# Patient Record
Sex: FEMALE | Race: BLACK OR AFRICAN AMERICAN | Employment: FULL TIME | URBAN - NONMETROPOLITAN AREA
[De-identification: names, ages, dates, MRNs, and addresses within clinical notes are randomized per-mention and may not be internally consistent; named-entity substitution may affect disease eponyms.]

---

## 2022-07-07 ENCOUNTER — HOSPITAL ENCOUNTER (EMERGENCY)
Age: 52
Discharge: HOME | End: 2022-07-07
Attending: INTERNAL MEDICINE
Payer: COMMERCIAL

## 2022-07-07 ENCOUNTER — APPOINTMENT (OUTPATIENT)
Dept: CT IMAGING | Age: 52
End: 2022-07-07
Attending: INTERNAL MEDICINE
Payer: COMMERCIAL

## 2022-07-07 VITALS
OXYGEN SATURATION: 100 % | SYSTOLIC BLOOD PRESSURE: 158 MMHG | RESPIRATION RATE: 18 BRPM | DIASTOLIC BLOOD PRESSURE: 89 MMHG | HEIGHT: 68 IN | TEMPERATURE: 98.5 F | HEART RATE: 82 BPM | BODY MASS INDEX: 34.4 KG/M2 | WEIGHT: 227 LBS

## 2022-07-07 DIAGNOSIS — G89.29 CHRONIC MIDLINE LOW BACK PAIN WITH LEFT-SIDED SCIATICA: Primary | ICD-10-CM

## 2022-07-07 DIAGNOSIS — M54.42 CHRONIC MIDLINE LOW BACK PAIN WITH LEFT-SIDED SCIATICA: Primary | ICD-10-CM

## 2022-07-07 LAB
APPEARANCE UR: CLEAR
BILIRUB UR QL: NEGATIVE
COLOR UR: YELLOW
GLUCOSE UR STRIP.AUTO-MCNC: NEGATIVE MG/DL
HGB UR QL STRIP: NEGATIVE
KETONES UR QL STRIP.AUTO: NEGATIVE MG/DL
LEUKOCYTE ESTERASE UR QL STRIP.AUTO: NEGATIVE
NITRITE UR QL STRIP.AUTO: NEGATIVE
PH UR STRIP: 7 [PH] (ref 5–8)
PROT UR STRIP-MCNC: NEGATIVE MG/DL
SP GR UR REFRACTOMETRY: 1.01 (ref 1–1.03)
UROBILINOGEN UR QL STRIP.AUTO: 0.2 EU/DL (ref 0.2–1)

## 2022-07-07 PROCEDURE — 74011250636 HC RX REV CODE- 250/636: Performed by: INTERNAL MEDICINE

## 2022-07-07 PROCEDURE — 81003 URINALYSIS AUTO W/O SCOPE: CPT

## 2022-07-07 PROCEDURE — 96372 THER/PROPH/DIAG INJ SC/IM: CPT

## 2022-07-07 PROCEDURE — 72131 CT LUMBAR SPINE W/O DYE: CPT

## 2022-07-07 PROCEDURE — 99284 EMERGENCY DEPT VISIT MOD MDM: CPT

## 2022-07-07 RX ORDER — VENLAFAXINE 50 MG/1
50 TABLET ORAL 3 TIMES DAILY
COMMUNITY

## 2022-07-07 RX ORDER — LISINOPRIL 10 MG/1
TABLET ORAL DAILY
COMMUNITY

## 2022-07-07 RX ORDER — PREGABALIN 100 MG/1
100 CAPSULE ORAL 2 TIMES DAILY
COMMUNITY

## 2022-07-07 RX ORDER — CYCLOBENZAPRINE HCL 10 MG
TABLET ORAL
COMMUNITY

## 2022-07-07 RX ORDER — METFORMIN HYDROCHLORIDE 750 MG/1
750 TABLET, EXTENDED RELEASE ORAL DAILY
COMMUNITY

## 2022-07-07 RX ORDER — PREDNISONE 10 MG/1
10 TABLET ORAL SEE ADMIN INSTRUCTIONS
COMMUNITY

## 2022-07-07 RX ORDER — DEXAMETHASONE SODIUM PHOSPHATE 4 MG/ML
6 INJECTION, SOLUTION INTRA-ARTICULAR; INTRALESIONAL; INTRAMUSCULAR; INTRAVENOUS; SOFT TISSUE
Status: COMPLETED | OUTPATIENT
Start: 2022-07-07 | End: 2022-07-07

## 2022-07-07 RX ORDER — MORPHINE SULFATE 4 MG/ML
4 INJECTION, SOLUTION INTRAMUSCULAR; INTRAVENOUS
Status: COMPLETED | OUTPATIENT
Start: 2022-07-07 | End: 2022-07-07

## 2022-07-07 RX ORDER — KETOROLAC TROMETHAMINE 30 MG/ML
30 INJECTION, SOLUTION INTRAMUSCULAR; INTRAVENOUS
Status: COMPLETED | OUTPATIENT
Start: 2022-07-07 | End: 2022-07-07

## 2022-07-07 RX ADMIN — DEXAMETHASONE SODIUM PHOSPHATE 6 MG: 4 INJECTION, SOLUTION INTRAMUSCULAR; INTRAVENOUS at 14:07

## 2022-07-07 RX ADMIN — MORPHINE SULFATE 4 MG: 4 INJECTION, SOLUTION INTRAMUSCULAR; INTRAVENOUS at 14:06

## 2022-07-07 RX ADMIN — KETOROLAC TROMETHAMINE 30 MG: 30 INJECTION, SOLUTION INTRAMUSCULAR at 14:06

## 2022-07-07 NOTE — ED PROVIDER NOTES
EMERGENCY DEPARTMENT HISTORY AND PHYSICAL EXAM      Date: 7/7/2022  Patient Name: Sylvia Hernandez      History of Presenting Illness     Chief Complaint   Patient presents with    LOW BACK PAIN       History Provided By: Patient    HPI: Sylvia Hernandez, 46 y.o. female with a past medical history significant for obesity;  HTN; DM; asthma; HLD; insomnia; chronic LBP for the past year that presents to the ED with cc of worsening lower back pain for the past several weeks. Pt was seen by NP Nila Carrillo on Tuesday and given pregabalin; flexeril and prednisone but states that it does not help her. She has lower back pain that radiates to the left buttock down to the foot. She has an MRI appt scheduled for [facility] on Sunday and a fu next Tuesday. No fever; chills; GI/ incontinence; trauma; weight loss. There are no other complaints, changes, or physical findings at this time. PCP: None    Current Outpatient Medications   Medication Sig Dispense Refill    predniSONE (STERAPRED DS) 10 mg dose pack Take 10 mg by mouth See Admin Instructions. See administration instruction per 10mg dose pack      cyclobenzaprine (FLEXERIL) 10 mg tablet Take  by mouth three (3) times daily as needed for Muscle Spasm(s).  pregabalin (LYRICA) 100 mg capsule Take 100 mg by mouth two (2) times a day.  metFORMIN ER (GLUCOPHAGE XR) 750 mg tablet Take 750 mg by mouth daily.  venlafaxine (EFFEXOR) 50 mg tablet Take 50 mg by mouth three (3) times daily.  lisinopriL (PriniviL) 10 mg tablet Take  by mouth daily. Past History   Past Medical History:  Past Medical History:   Diagnosis Date    Diabetes ([address]     Hypertension        Past Surgical History:  Past Surgical History:   Procedure Laterality Date    HX GYN         Family History:  History reviewed. No pertinent family history.     Social History:  Social History     Tobacco Use    Smoking status: Current Every Day Smoker     Packs/day: 0.50    Smokeless tobacco: Never Used   Substance Use Topics    Alcohol use: Not Currently    Drug use: Not Currently       Allergies:  No Known Allergies  Review of Systems   Review of Systems   Constitutional: Negative for chills and fever. HENT: Negative for sore throat and trouble swallowing. Eyes: Negative for visual disturbance. Respiratory: Negative for cough and shortness of breath. Cardiovascular: Negative for chest pain and palpitations. Gastrointestinal: Negative for abdominal pain, diarrhea, nausea and vomiting. Genitourinary: Negative for dysuria and flank pain. Musculoskeletal: Positive for back pain. Skin: Negative for rash. Neurological: Negative for weakness, numbness and headaches. Psychiatric/Behavioral: Negative for confusion. Physical Exam   Physical Exam  Vitals and nursing note reviewed. Constitutional:       General: She is not in acute distress. Appearance: She is obese. She is not ill-appearing or diaphoretic. HENT:      Head: Normocephalic. Mouth/Throat:      Pharynx: Oropharynx is clear. Eyes:      Conjunctiva/sclera: Conjunctivae normal.   Cardiovascular:      Rate and Rhythm: Regular rhythm. Heart sounds: Normal heart sounds. Pulmonary:      Effort: No respiratory distress. Breath sounds: Normal breath sounds. No wheezing. Abdominal:      General: There is no distension. Palpations: Abdomen is soft. Tenderness: There is no abdominal tenderness. There is no guarding. Musculoskeletal:         General: Normal range of motion. Cervical back: Neck supple. Comments: SLR on left side. Motor; sensation to PP; reflexes; DP pulse normal.    Neurological:      Mental Status: She is alert.          Lab and Diagnostic Study Results   Labs -     Recent Results (from the past 12 hour(s))   URINALYSIS W/ RFLX MICROSCOPIC    Collection Time: 07/07/22  2:07 PM   Result Value Ref Range    Color Yellow      Appearance Clear Specific gravity 1.008 1.005 - 1.030      pH (UA) 7.0 5.0 - 8.0      Protein Negative Negative mg/dL    Glucose Negative Negative mg/dL    Ketone Negative Negative mg/dL    Bilirubin Negative Negative      Blood Negative Negative      Urobilinogen 0.2 0.2 - 1.0 EU/dL    Nitrites Negative Negative      Leukocyte Esterase Negative Negative         Radiologic Studies -   [unfilled]  CT Results  (Last 48 hours)               07/07/22 1304  CT SPINE LUMB WO CONT Final result    Impression:  1. Straightening of usual lumbar lordosis without evidence of fracture or   traumatic listhesis. 2. Lower lumbar spondylosis and facet arthropathy with areas of osseous canal   and neuroforaminal encroachment described above. Narrative:  Examination: CT lumbar spine without contrast.       HISTORY: Back pain, difficulty in mobility. TECHNIQUE: CT imaging of the lumbar spine was performed in the axial plane   utilizing both bone and soft tissue reconstruction algorithms. Standard coronal   and sagittal reformatted images were performed by the technologist and are   included in interpretation. One or more dose reduction techniques were used on this CT: automated exposure   control, adjustment of the mAs and/or kVp according to patient size, and   iterative reconstruction techniques. The specific techniques used on this CT   exam have been documented in the patient's electronic medical record. Digital   Imaging and Communications in Medicine (DICOM) format image data are available   to nonaffiliated external healthcare facilities or entities on a secure, media   free, reciprocally searchable basis with patient authorization for at least a   12-month period after this study. Regi Spinner FINDINGS:       Coronal reformatted images demonstrate no significant rotatory curvature to the   lumbar spine. There is straightening of usual lumbar lordosis without listhesis.        Vertebral body heights are normal. There is no evidence of fracture or   aggressive appearing osseous lesion. Multilevel lumbar spondylosis with mild   intervertebral disc height loss and endplate reactive changes with degenerative   spur formation present across the L2-L5 segments. Multilevel facet arthrosis,   greatest L5-S1 without evidence of pars interarticularis defect or facet   malalignment. Correlation of axial and sagittal imaging demonstrates likely at least moderate   osseous canal narrowing at L2-L3 and L4-L5. Asymmetric nature of disc bulge at   the L4-L5 level results in narrowing of the left lateral recess. Moderately   severe right greater than left neuroforaminal stenosis at L5-S1. Review retroperitoneal soft tissue structures demonstrates scattered   atherosclerotic calcifications without aneurysmal dilatation. No suspicious   renal lesion. No abnormal bowel wall thickening. Normal appendix. Included sacrum is normal in appearance. Mild left SI joint arthrosis. CXR Results  (Last 48 hours)    None          Medical Decision Making and ED Course   - I am the first and primary provider for this patient AND AM THE PRIMARY PROVIDER OF RECORD. I reviewed the vital signs, available nursing notes, past medical history, past surgical history, family history and social history. - Initial assessment performed. The patients presenting problems have been discussed, and the staff are in agreement with the care plan formulated and outlined with them. I have encouraged them to ask questions as they arise throughout their visit. Differential Diagnosis & Medical Decision Making Provider Note:       Vital Signs-Reviewed the patient's vital signs.   Patient Vitals for the past 12 hrs:   Temp Pulse Resp BP SpO2   07/07/22 1231 98.5 °F (36.9 °C) 88 18 (!) 162/110 100 %       ED Course:      DDX: Back pain: musculoskeletal, strain, sprain, renal colic, pyelonephritis, UTI, HNP, AAA, diskitis, sciatica, radiculopathy    Procedures and Critical Care       Disposition   Disposition:   Discharged    DISCHARGE PLAN:  1. Current Discharge Medication List      CONTINUE these medications which have NOT CHANGED    Details   predniSONE (STERAPRED DS) 10 mg dose pack Take 10 mg by mouth See Admin Instructions. See administration instruction per 10mg dose pack      cyclobenzaprine (FLEXERIL) 10 mg tablet Take  by mouth three (3) times daily as needed for Muscle Spasm(s). pregabalin (LYRICA) 100 mg capsule Take 100 mg by mouth two (2) times a day. metFORMIN ER (GLUCOPHAGE XR) 750 mg tablet Take 750 mg by mouth daily. venlafaxine (EFFEXOR) 50 mg tablet Take 50 mg by mouth three (3) times daily. lisinopriL (PriniviL) 10 mg tablet Take  by mouth daily. 2.   Follow-up Information     Follow up With Specialties Details Why Contact Daija Naylor Physician Assistant Schedule an appointment as soon as possible for a visit in 5 days  [address] [address]  [phone]          3. Return to ED if worse   4. Current Discharge Medication List        Remove if not discharged    Diagnosis/Clinical Impression     Clinical Impression:   1. Chronic midline low back pain with left-sided sciatica        Attestations:  Maryam Hartman MD    Please note that this dictation was completed with [employer], the computer voice recognition software. Quite often unanticipated grammatical, syntax, homophones, and other interpretive errors are inadvertently transcribed by the computer software. Please disregard these errors. Please excuse any errors that have escaped final proofreading. Thank you.

## 2022-07-07 NOTE — ED TRIAGE NOTES
Pt with hx of DDD and sciatica states that the pain has been getting worse x 6 weeks. Saw orthopedic MD on Tuesday - and an MRI was ordered. States the pain shoots from her hip and down her left leg. States that she is unable to sit or lay down. Called her ortho and they  Made a tele visit for Tuesday but pt states that she cannot wait that long.